# Patient Record
Sex: FEMALE | Race: WHITE | NOT HISPANIC OR LATINO | URBAN - METROPOLITAN AREA
[De-identification: names, ages, dates, MRNs, and addresses within clinical notes are randomized per-mention and may not be internally consistent; named-entity substitution may affect disease eponyms.]

---

## 2017-08-23 ENCOUNTER — OFFICE VISIT - HEALTHEAST (OUTPATIENT)
Dept: FAMILY MEDICINE | Facility: CLINIC | Age: 57
End: 2017-08-23

## 2017-08-23 DIAGNOSIS — M54.50 LOW BACK PAIN: ICD-10-CM

## 2017-09-06 ENCOUNTER — COMMUNICATION - HEALTHEAST (OUTPATIENT)
Dept: SCHEDULING | Facility: CLINIC | Age: 57
End: 2017-09-06

## 2017-09-13 ENCOUNTER — OFFICE VISIT - HEALTHEAST (OUTPATIENT)
Dept: INTERNAL MEDICINE | Facility: CLINIC | Age: 57
End: 2017-09-13

## 2017-09-13 DIAGNOSIS — M54.50 LOW BACK PAIN: ICD-10-CM

## 2017-09-13 DIAGNOSIS — Z09 FOLLOW UP: ICD-10-CM

## 2017-09-13 RX ORDER — MULTIVITAMIN
1 CAPSULE ORAL DAILY
Status: SHIPPED | COMMUNITY
Start: 2017-09-13

## 2017-09-13 RX ORDER — GARLIC 180 MG
TABLET, DELAYED RELEASE (ENTERIC COATED) ORAL
Status: SHIPPED | COMMUNITY
Start: 2017-09-13

## 2017-09-13 RX ORDER — CHLORAL HYDRATE 500 MG
CAPSULE ORAL DAILY
Status: SHIPPED | COMMUNITY
Start: 2017-09-13

## 2017-09-13 ASSESSMENT — MIFFLIN-ST. JEOR: SCORE: 1024.92

## 2021-05-26 ENCOUNTER — RECORDS - HEALTHEAST (OUTPATIENT)
Dept: ADMINISTRATIVE | Facility: CLINIC | Age: 61
End: 2021-05-26

## 2021-05-31 VITALS — HEIGHT: 61 IN | WEIGHT: 115.2 LBS | BODY MASS INDEX: 21.75 KG/M2

## 2021-05-31 VITALS — WEIGHT: 117.2 LBS

## 2021-06-25 NOTE — PROGRESS NOTES
Progress Notes by Raz Mon at 9/13/2017  9:20 AM     Author: Raz Mon Service: -- Author Type: Nurse Practitioner    Filed: 9/13/2017 12:29 PM Encounter Date: 9/13/2017 Status: Signed    : Raz Mon [facility]    Date of Service: 9/13/2017  Primary Provider: No Primary Care Provider    Patient Care Team:  No Primary Care Provider as PCP - General     ______________________________________________________________________     Patient's Pharmacy:    [id number] - [place], [place] - [address]  [address] [address]  Phone: [phone] Fax: [phone]     Patient's Insurance:    Payor: [facility] / Plan: [employer] CHO[employer] / Product Type: MNSure /     ______________________________________________________________________    Assessment:    1. Low back pain    2. Follow up       ______________________________________________________________________      PHQ-2 Total Score: 0 (9/13/2017  9:17 AM)     Plan:  Patient Instructions   1. Medications prescribed at this visit:  - predniSONE (DELTASONE) 20 MG tablet; Take 2 tablets (40 mg total) by mouth daily for 5 days.  Dispense: 10 tablet; Refill: 0    2. Follow up with your PCP.        ______________________________________________________________________     Precious Mcleod is 56 y.o. female who comes in today for:    Chief Complaint   Patient presents with   ? Establish Care     no previous primary   ? Follow-up     Walk in care. Pain on the right side lower back. pain is still there pain is better        There is no problem list on file for this patient.    Current Outpatient Prescriptions   Medication Sig   ? cyanocobalamin (VITAMIN B-12) 50 mcg tablet Take 50 mcg by mouth daily.   ? ginseng 250 mg cap Take by mouth.   ? GREEN TEA EXTRACT ORAL Take by mouth.   ? multivitamin capsule Take 1 capsule by mouth daily.   ? OMEGA-3/DHA/EPA/FISH OIL  "(FISH OIL-OMEGA-3 FATTY ACIDS) 300-1,000 mg capsule Take by mouth daily.   ? predniSONE (DELTASONE) 20 MG tablet Take 2 tablets (40 mg total) by mouth daily for 5 days.     Social History     Social History   ? Marital status: Single     Spouse name: N/A   ? Number of children: 0   ? Years of education: College     Occupational History   ? 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 Smoking status: Current Every Day Smoker     Packs/day: 1.00     Years: 41.00     Types: Cigarettes   ? Smokeless tobacco: Never Used      Comment: Considering quitting   ? Alcohol use Yes      Comment: Socially   ? Drug use: No   ? Sexual activity: No     Other Topics Concern   ? Not on file     Social History Narrative     ______________________________________________________________________     History of present illness: Precious Mcleod is a pleasant 56 y.o. female with a PMH pertinent for MS, HTN, who presents in clinic today for follow up Walk-In Clinic visit for right sided low back pain.  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  She developed right sided low back and leg pain approximately 3 weeks ago.  She went to [facility] for evaluation and was treated with prednisone which was of benefit.  Not here to establish care, apparently, as she has a provider that she sees and has been working with \"Sirisha\" at [facility] for her right knee pain and instability issues.  She does not complain of any right low back or leg pain today, per se, but it has been off and on since her initial evaluation.  Overall, it is improved since it had first developed.  She wonders if it is more in the right hip as opposed to her low back, but she will follow up with her provider noted above regarding this.    Review of systems:   On ROS, the patient denies chest pain, abdominal pain, back pain or leg pains today.  Positive for GONZALEZ with increased activity, symptoms as noted in the " "HPI.    ______________________________________________________________________    Wt Readings from Last 3 Encounters:   09/13/17 115 lb 3.2 oz (52.3 kg)   08/23/17 117 lb 3.2 oz (53.2 kg)     BP Readings from Last 3 Encounters:   09/13/17 (!) 140/92   08/23/17 156/80     BP (!) 140/92 (Patient Site: Right Arm, Patient Position: Sitting, Cuff Size: Adult Regular)  Pulse 100  Ht 5' 1\" (1.549 m)  Wt 115 lb 3.2 oz (52.3 kg)  LMP 01/01/2009  BMI 21.77 kg/m2     Physical Exam:  General Appearance: Alert, cooperative, no distress, appears stated age  Head: Normocephalic, without obvious abnormality, atraumatic  Eyes: PERRL, conjunctiva/corneas clear  Neck: Supple, symmetrical, trachea midline, no adenopathy  Back: Symmetric, ROM normal, no CVA tenderness  Lungs: Clear to auscultation bilaterally, respirations unlabored; prolonged inspiration/expiration - ?COPD  Heart: Regular rate and rhythm, S1 and S2 normal, no murmur, rub, or gallop  Abdomen: Soft, non-tender, bowel sounds active all four quadrants  Musculoskeletal: Normal range of motion. No joint swelling or deformity.   Extremities: Extremities normal, atraumatic, no cyanosis or edema  Skin: Skin color, texture, turgor normal, no rashes or lesions  Lymph nodes: Cervical & supraclavicular nodes normal  Neurologic: She is alert & oriented x 3. She has normal gait.   Psychiatric: She has a normal mood and affect.       Raz Mon, [facility]  Internal Medicine  [facility]     Return for Follow-up with your Primary Care Provider.        "